# Patient Record
Sex: FEMALE | Race: WHITE | Employment: FULL TIME | URBAN - METROPOLITAN AREA
[De-identification: names, ages, dates, MRNs, and addresses within clinical notes are randomized per-mention and may not be internally consistent; named-entity substitution may affect disease eponyms.]

---

## 2018-08-20 ENCOUNTER — HOSPITAL ENCOUNTER (OUTPATIENT)
Dept: GENERAL RADIOLOGY | Age: 53
Discharge: HOME | End: 2018-08-20
Attending: INTERNAL MEDICINE | Admitting: INTERNAL MEDICINE
Payer: COMMERCIAL

## 2018-08-20 ENCOUNTER — OFFICE VISIT (OUTPATIENT)
Dept: INTERNAL MEDICINE CLINIC | Facility: CLINIC | Age: 53
End: 2018-08-20
Payer: COMMERCIAL

## 2018-08-20 VITALS
DIASTOLIC BLOOD PRESSURE: 83 MMHG | HEART RATE: 78 BPM | SYSTOLIC BLOOD PRESSURE: 128 MMHG | WEIGHT: 129 LBS | HEIGHT: 67 IN | TEMPERATURE: 99 F | RESPIRATION RATE: 18 BRPM | BODY MASS INDEX: 20.25 KG/M2

## 2018-08-20 DIAGNOSIS — L98.9 SKIN LESION: ICD-10-CM

## 2018-08-20 DIAGNOSIS — M25.532 LEFT WRIST PAIN: ICD-10-CM

## 2018-08-20 DIAGNOSIS — Z00.00 ENCOUNTER FOR ANNUAL PHYSICAL EXAM: Primary | ICD-10-CM

## 2018-08-20 DIAGNOSIS — Z12.11 SCREENING FOR COLON CANCER: ICD-10-CM

## 2018-08-20 DIAGNOSIS — Z12.39 SCREENING FOR BREAST CANCER: ICD-10-CM

## 2018-08-20 DIAGNOSIS — Z76.89 ENCOUNTER TO ESTABLISH CARE: ICD-10-CM

## 2018-08-20 LAB
ALBUMIN SERPL BCP-MCNC: 4.5 G/DL (ref 3.5–4.8)
ALBUMIN/GLOB SERPL: 2.6 {RATIO} (ref 1–2)
ALP SERPL-CCNC: 60 U/L (ref 32–100)
ALT SERPL-CCNC: 15 U/L (ref 14–54)
ANION GAP SERPL CALC-SCNC: 6 MMOL/L (ref 0–18)
AST SERPL-CCNC: 18 U/L (ref 15–41)
BASOPHILS # BLD: 0 K/UL (ref 0–0.2)
BASOPHILS NFR BLD: 1 %
BILIRUB SERPL-MCNC: 0.9 MG/DL (ref 0.3–1.2)
BUN SERPL-MCNC: 10 MG/DL (ref 8–20)
BUN/CREAT SERPL: 11.9 (ref 10–20)
CALCIUM SERPL-MCNC: 9.8 MG/DL (ref 8.5–10.5)
CHLORIDE SERPL-SCNC: 105 MMOL/L (ref 95–110)
CHOLEST SERPL-MCNC: 204 MG/DL (ref 110–200)
CO2 SERPL-SCNC: 29 MMOL/L (ref 22–32)
CREAT SERPL-MCNC: 0.84 MG/DL (ref 0.5–1.5)
EOSINOPHIL # BLD: 0 K/UL (ref 0–0.7)
EOSINOPHIL NFR BLD: 1 %
ERYTHROCYTE [DISTWIDTH] IN BLOOD BY AUTOMATED COUNT: 13 % (ref 11–15)
GLOBULIN PLAS-MCNC: 1.7 G/DL (ref 2.5–3.7)
GLUCOSE SERPL-MCNC: 98 MG/DL (ref 70–99)
HCT VFR BLD AUTO: 43.3 % (ref 35–48)
HDLC SERPL-MCNC: 79 MG/DL
HGB BLD-MCNC: 14.9 G/DL (ref 12–16)
LDLC SERPL CALC-MCNC: 110 MG/DL (ref 0–99)
LYMPHOCYTES # BLD: 1.1 K/UL (ref 1–4)
LYMPHOCYTES NFR BLD: 22 %
MCH RBC QN AUTO: 33.4 PG (ref 27–32)
MCHC RBC AUTO-ENTMCNC: 34.4 G/DL (ref 32–37)
MCV RBC AUTO: 97.2 FL (ref 80–100)
MONOCYTES # BLD: 0.2 K/UL (ref 0–1)
MONOCYTES NFR BLD: 5 %
NEUTROPHILS # BLD AUTO: 3.4 K/UL (ref 1.8–7.7)
NEUTROPHILS NFR BLD: 71 %
NONHDLC SERPL-MCNC: 125 MG/DL
OSMOLALITY UR CALC.SUM OF ELEC: 289 MOSM/KG (ref 275–295)
PATIENT FASTING: NO
PLATELET # BLD AUTO: 158 K/UL (ref 140–400)
PMV BLD AUTO: 9.2 FL (ref 7.4–10.3)
POTASSIUM SERPL-SCNC: 4.3 MMOL/L (ref 3.3–5.1)
PROT SERPL-MCNC: 6.2 G/DL (ref 5.9–8.4)
RBC # BLD AUTO: 4.45 M/UL (ref 3.7–5.4)
SODIUM SERPL-SCNC: 140 MMOL/L (ref 136–144)
TRIGL SERPL-MCNC: 75 MG/DL (ref 1–149)
WBC # BLD AUTO: 4.8 K/UL (ref 4–11)

## 2018-08-20 PROCEDURE — 99386 PREV VISIT NEW AGE 40-64: CPT | Performed by: INTERNAL MEDICINE

## 2018-08-20 PROCEDURE — 73110 X-RAY EXAM OF WRIST: CPT | Performed by: INTERNAL MEDICINE

## 2018-08-20 RX ORDER — FLUOCINOLONE ACETONIDE 0.11 MG/ML
OIL AURICULAR (OTIC)
Qty: 1 BOTTLE | Refills: 0 | Status: SHIPPED | OUTPATIENT
Start: 2018-08-20

## 2018-08-20 NOTE — PATIENT INSTRUCTIONS
kin lesion- referalto dermatology left wrist pain  Left wrist pain etiology ?  Order x r wrist, ibuprofen as needed, wrist splint at night   Right ear ?psoriasis- continue with current meds , if she noticing lymph nodes - needs to  Be seen by ent  Pt' s Jeffery Lord

## 2018-08-20 NOTE — PROGRESS NOTES
HPI:   Leonid Iqbal is a 47year old female who presents for a complete physical exam.  She is menopausal.  She had mammogram last year and everything was normal.  Had Pap smear last year was normal.  She never had colonoscopy.   Complaint of left wrist milan insomnia. Integumentary Negative Breast discharge, breast lump(s), hair loss and rash. MS Negative Back pain and joint pain. +left wrist pain   Hema/Lymph Negative Easy bleeding, easy bruising and thromboembolic events.    Allergic/Immuno Negative Enviro lesion- referalto dermatology left wrist pain  Left wrist pain etiology ?  Order x r wrist, ibuprofen as needed, wrist splint at night   Right ear ?psoriasis- continue with current meds , if she noticing lymph nodes - needs to  Be seen by ent  Pt' s weight

## 2018-08-21 LAB — HBA1C MFR BLD: 5.1 % (ref 4–6)

## 2018-09-11 ENCOUNTER — HOSPITAL ENCOUNTER (OUTPATIENT)
Dept: MAMMOGRAPHY | Age: 53
Discharge: HOME | End: 2018-09-11
Attending: INTERNAL MEDICINE
Payer: COMMERCIAL

## 2018-09-11 DIAGNOSIS — Z12.39 SCREENING FOR BREAST CANCER: ICD-10-CM

## 2018-09-11 PROCEDURE — 77067 SCR MAMMO BI INCL CAD: CPT | Performed by: INTERNAL MEDICINE

## 2018-09-11 PROCEDURE — 77063 BREAST TOMOSYNTHESIS BI: CPT | Performed by: INTERNAL MEDICINE

## 2018-10-31 ENCOUNTER — TELEPHONE (OUTPATIENT)
Dept: INTERNAL MEDICINE CLINIC | Facility: CLINIC | Age: 53
End: 2018-10-31

## 2018-10-31 NOTE — TELEPHONE ENCOUNTER
I order colonoscopy since she is 47 and very one should have after age of 49, but if she cant that's ok, she can do as soon as she can

## 2018-10-31 NOTE — TELEPHONE ENCOUNTER
Patient called, her mother recently had open heart surgery and needs to go to [place] to help take care of her mother. Patient had to cancel her colon screening procedure and she tried to r/s but they are booked until December and she will be in [place].

## 2018-12-19 ENCOUNTER — TELEPHONE (OUTPATIENT)
Dept: INTERNAL MEDICINE CLINIC | Facility: CLINIC | Age: 53
End: 2018-12-19

## 2018-12-19 ENCOUNTER — APPOINTMENT (OUTPATIENT)
Dept: LAB | Facility: HOSPITAL | Age: 53
End: 2018-12-19
Attending: INTERNAL MEDICINE
Payer: COMMERCIAL

## 2018-12-19 DIAGNOSIS — Z22.322 MRSA (METHICILLIN RESISTANT STAPH AUREUS) CULTURE POSITIVE: ICD-10-CM

## 2018-12-19 PROCEDURE — 87081 CULTURE SCREEN ONLY: CPT

## 2018-12-19 NOTE — TELEPHONE ENCOUNTER
Patient calling and asking if she can postpone her colonoscopy until later in 2019 and have a screening with [facility] at this time. She states she is extremely strapped for time, her mother is sick and she will have a large deductible to meet.

## 2018-12-26 ENCOUNTER — TELEPHONE (OUTPATIENT)
Dept: INTERNAL MEDICINE CLINIC | Facility: CLINIC | Age: 53
End: 2018-12-26

## 2018-12-26 NOTE — TELEPHONE ENCOUNTER
Patient called regarding MRSA swab for colonoscopy If that is what Dr. Sylvester required for procedure we may do in office or at any lab locations. Patient said [facility] is not in her network,  Cannot get MRSA swab done at any locations.   Debby Navas will cancel co

## 2020-11-11 ENCOUNTER — TELEPHONE (OUTPATIENT)
Dept: INTERNAL MEDICINE CLINIC | Facility: CLINIC | Age: 55
End: 2020-11-11

## 2020-11-11 NOTE — TELEPHONE ENCOUNTER
Not seen since 2018 she is due for physical/mammogram/Pap/colonoscopy can you please check with the patient if she is following with therapist from PCP then we need to remove from my chart

## 2021-09-14 ENCOUNTER — OFFICE VISIT (OUTPATIENT)
Dept: OTOLARYNGOLOGY | Facility: CLINIC | Age: 56
End: 2021-09-14
Payer: COMMERCIAL

## 2021-09-14 VITALS — WEIGHT: 127 LBS | HEIGHT: 66 IN | BODY MASS INDEX: 20.41 KG/M2

## 2021-09-14 DIAGNOSIS — H65.02 NON-RECURRENT ACUTE SEROUS OTITIS MEDIA OF LEFT EAR: Primary | ICD-10-CM

## 2021-09-14 PROCEDURE — 3008F BODY MASS INDEX DOCD: CPT | Performed by: SPECIALIST

## 2021-09-14 PROCEDURE — 99203 OFFICE O/P NEW LOW 30 MIN: CPT | Performed by: SPECIALIST

## 2021-09-14 RX ORDER — ALBUTEROL SULFATE 90 UG/1
2 AEROSOL, METERED RESPIRATORY (INHALATION) EVERY 4 HOURS PRN
COMMUNITY
Start: 2020-02-10

## 2021-09-14 RX ORDER — PROGESTERONE 100 MG/1
1 CAPSULE ORAL DAILY
COMMUNITY
Start: 2021-07-26

## 2021-09-14 RX ORDER — FLUOCINOLONE ACETONIDE 0.11 MG/ML
OIL AURICULAR (OTIC)
COMMUNITY
Start: 2020-03-18

## 2021-09-14 RX ORDER — ESTRADIOL 0.05 MG/D
PATCH, EXTENDED RELEASE TRANSDERMAL
COMMUNITY
Start: 2021-07-26

## 2021-09-14 RX ORDER — LORATADINE 10 MG/1
10 TABLET ORAL DAILY
COMMUNITY

## 2021-09-14 RX ORDER — MAGNESIUM OXIDE 400 MG (241.3 MG MAGNESIUM) TABLET
TABLET
COMMUNITY

## 2021-09-14 RX ORDER — PREDNISONE 20 MG/1
20 TABLET ORAL DAILY
Qty: 3 TABLET | Refills: 0 | Status: SHIPPED | OUTPATIENT
Start: 2021-09-14 | End: 2021-09-28 | Stop reason: ALTCHOICE

## 2021-09-14 RX ORDER — AMOXICILLIN AND CLAVULANATE POTASSIUM 875; 125 MG/1; MG/1
1 TABLET, FILM COATED ORAL EVERY 12 HOURS
Qty: 20 TABLET | Refills: 0 | Status: SHIPPED | OUTPATIENT
Start: 2021-09-14 | End: 2021-09-28 | Stop reason: ALTCHOICE

## 2021-09-14 RX ORDER — BIOTIN 1 MG
TABLET ORAL
COMMUNITY

## 2021-09-14 NOTE — PROGRESS NOTES
Shira Mcdowell is a 48year old female. Patient presents with:  Ear Problem: pt is here today for having left ear clogged. pt fell back in april, and since the fall she has been having pain on the left side of her ear/jaw area.  She did stick a qt far into he Never Used    Alcohol use: Yes      Comment: wine once a night     Drug use: No       REVIEW OF SYSTEMS:   GENERAL HEALTH: feels well otherwise  GENERAL : denies fever, chills, sweats, weight loss, weight gain  SKIN: denies any unusual skin lesions or rash agrees to the plan.       Raysa Lacey MD  9/14/2021  3:57 PM

## 2021-09-14 NOTE — PATIENT INSTRUCTIONS
Have an acute left serous otitis media. There is a large crust on your tympanic membrane, perhaps even from the membrane being perforated. No air travel until the ear is improved or resolved. Follow-up in 2 weeks time.

## 2021-09-28 ENCOUNTER — OFFICE VISIT (OUTPATIENT)
Dept: OTOLARYNGOLOGY | Facility: CLINIC | Age: 56
End: 2021-09-28
Payer: COMMERCIAL

## 2021-09-28 VITALS — HEIGHT: 66 IN | WEIGHT: 127 LBS | BODY MASS INDEX: 20.41 KG/M2

## 2021-09-28 DIAGNOSIS — H65.02 NON-RECURRENT ACUTE SEROUS OTITIS MEDIA OF LEFT EAR: Primary | ICD-10-CM

## 2021-09-28 PROCEDURE — 99213 OFFICE O/P EST LOW 20 MIN: CPT | Performed by: SPECIALIST

## 2021-09-28 PROCEDURE — 3008F BODY MASS INDEX DOCD: CPT | Performed by: SPECIALIST

## 2021-09-28 NOTE — PROGRESS NOTES
Brenton Ruiz is a 48year old female. Patient presents with: Follow - Up: pt is here today for a 2 week follow up on her left ear issues, she states it is doing better but /pain on the lower ear    HPI:   Recheck left serous otitis media.     C headaches    EXAM:   Ht 5' 6\" (1.676 m)   Wt 127 lb (57.6 kg)   BMI 20.50 kg/m²   System Details   Skin Inspection - Normal.   Constitutional Overall appearance - Normal.   Head/Face Facial features - Normal. Eyebrows - Normal. Skull - Normal.   Eyes Conj

## 2021-09-28 NOTE — PATIENT INSTRUCTIONS
Resolved left serous otitis media. I agree that you may want to consider physical therapy for your neck strain after the accident. You are cleared to go back to your profession as a .

## (undated) NOTE — LETTER
9/14/2021          To Whom It May Concern:    Shira Mcdowell is currently under my medical care and may not return to work at this time. Please excuse Yisel Vargas for 2 weeks. If you require additional information please contact our office.         Sincerel